# Patient Record
Sex: FEMALE | Race: OTHER | Employment: UNEMPLOYED | URBAN - METROPOLITAN AREA
[De-identification: names, ages, dates, MRNs, and addresses within clinical notes are randomized per-mention and may not be internally consistent; named-entity substitution may affect disease eponyms.]

---

## 2017-05-07 ENCOUNTER — APPOINTMENT (OUTPATIENT)
Dept: CT IMAGING | Age: 47
End: 2017-05-07
Attending: EMERGENCY MEDICINE

## 2017-05-07 ENCOUNTER — HOSPITAL ENCOUNTER (EMERGENCY)
Age: 47
Discharge: HOME | End: 2017-05-07
Attending: EMERGENCY MEDICINE

## 2017-05-07 VITALS
WEIGHT: 160.94 LBS | HEIGHT: 61 IN | SYSTOLIC BLOOD PRESSURE: 118 MMHG | RESPIRATION RATE: 16 BRPM | BODY MASS INDEX: 30.39 KG/M2 | HEART RATE: 82 BPM | OXYGEN SATURATION: 99 % | DIASTOLIC BLOOD PRESSURE: 74 MMHG | TEMPERATURE: 98 F

## 2017-05-07 DIAGNOSIS — R10.9 ABDOMINAL PAIN OF UNKNOWN ETIOLOGY: Primary | ICD-10-CM

## 2017-05-07 PROCEDURE — 80053 COMPREHEN METABOLIC PANEL: CPT

## 2017-05-07 PROCEDURE — 96375 TX/PRO/DX INJ NEW DRUG ADDON: CPT

## 2017-05-07 PROCEDURE — 99284 EMERGENCY DEPT VISIT MOD MDM: CPT

## 2017-05-07 PROCEDURE — 83690 ASSAY OF LIPASE: CPT

## 2017-05-07 PROCEDURE — 85025 COMPLETE CBC W/AUTO DIFF WBC: CPT

## 2017-05-07 PROCEDURE — 74176 CT ABD & PELVIS W/O CONTRAST: CPT | Performed by: EMERGENCY MEDICINE

## 2017-05-07 PROCEDURE — 86038 ANTINUCLEAR ANTIBODIES: CPT | Performed by: EMERGENCY MEDICINE

## 2017-05-07 PROCEDURE — 81003 URINALYSIS AUTO W/O SCOPE: CPT

## 2017-05-07 PROCEDURE — 96374 THER/PROPH/DIAG INJ IV PUSH: CPT

## 2017-05-07 PROCEDURE — 96376 TX/PRO/DX INJ SAME DRUG ADON: CPT

## 2017-05-07 RX ORDER — KETOROLAC TROMETHAMINE 30 MG/ML
30 INJECTION, SOLUTION INTRAMUSCULAR; INTRAVENOUS ONCE
Status: COMPLETED | OUTPATIENT
Start: 2017-05-07 | End: 2017-05-07

## 2017-05-07 RX ORDER — KETOROLAC TROMETHAMINE 10 MG/1
10 TABLET, FILM COATED ORAL EVERY 6 HOURS PRN
COMMUNITY

## 2017-05-07 RX ORDER — HYDROMORPHONE HYDROCHLORIDE 1 MG/ML
1 INJECTION, SOLUTION INTRAMUSCULAR; INTRAVENOUS; SUBCUTANEOUS EVERY 30 MIN PRN
Status: DISCONTINUED | OUTPATIENT
Start: 2017-05-07 | End: 2017-05-07

## 2017-05-07 RX ORDER — HYDROCODONE BITARTRATE AND ACETAMINOPHEN 5; 325 MG/1; MG/1
1 TABLET ORAL EVERY 6 HOURS PRN
COMMUNITY

## 2017-05-07 RX ORDER — ONDANSETRON 2 MG/ML
8 INJECTION INTRAMUSCULAR; INTRAVENOUS ONCE
Status: COMPLETED | OUTPATIENT
Start: 2017-05-07 | End: 2017-05-07

## 2017-05-07 RX ORDER — DICYCLOMINE HCL 20 MG
20 TABLET ORAL 4 TIMES DAILY PRN
Qty: 30 TABLET | Refills: 0 | Status: SHIPPED | OUTPATIENT
Start: 2017-05-07 | End: 2017-06-06

## 2017-05-07 NOTE — ED NOTES
Attempted to call patient to notify of radiologic discrepancy, there was no answer and then the phone went busy unable to leave message

## 2017-05-08 NOTE — ED NOTES
Unable to reach patient after multiple attempts regarding CT scan discrepancy. Certified letter and CT report sent to patient home to inform her of need for follow up with PCP regarding diverticulum vesicourachal seen on CT over read.

## 2017-05-08 NOTE — ED PROVIDER NOTES
Patient Seen in: THE [facility] Emergency Department In [place]    History   Patient presents with:  Abdomen/Flank Pain (GI/)    Stated Complaint: right sided abdomen and flank pain w nausea 3 days     HPI    Patient states that she has had 3 days of contin Pulse 05/07/17 0501 88   Resp 05/07/17 0501 16   Temp 05/07/17 0501 97.9 °F (36.6 °C)   Temp src --    SpO2 05/07/17 0501 96 %   O2 Device --        Current:/74 mmHg  Pulse 82  Temp(Src) 97.9 °F (36.6 °C)  Resp 16  Ht 154.9 cm (5' 1\")  Wt 73 kg  B lesion.     BILIARY:  Status post cholecystectomy. No dilated bile ducts are seen.   PANCREAS:  Normal.  No lesion, fluid collection, ductal dilatation, or atrophy.     SPLEEN:  Normal.  No enlargement or focal lesion.     KIDNEYS:  Normal.  No mass, obstru 4 (four) times daily as needed., Script printed, Disp-30 tablet, R-0

## (undated) NOTE — ED AVS SNAPSHOT
THE [facility] Emergency Department in [facility]    Phone:  [phone]    Fax:  [phone]           Malik Reyes   MRN: [id number]    Department:  THE [facility] Emergency Department in [place]   Date of Visit: Take 1 tablet (20 mg total) by mouth 4 (four) times daily as needed.             Where to Get Your Medications      You can get these medications from any pharmacy     Bring a paper prescription for each of these medications    - Dicyclomine HCl 20 MG Tabs tell this physician (or your personal doctor if your instructions are to return to your personal doctor) about any new or lasting problems. The primary care or specialist physician will see patients referred from the [facility] Emergency Department.  lCarence Kulkarni - If you are a smoker or have smoked in the last 12 months, we encourage you to explore options for quitting.     - If you have concerns related to behavioral health issues or thoughts of harming yourself, contact [facility] a

## (undated) NOTE — ED AVS SNAPSHOT
[facility] in [address] [place] [address]    Phone:  [phone]    Fax:  [phone]           Malik Reyes   MRN: [id number]    Department:  [facility] in [place]   Date of Visit: IF THERE IS ANY CHANGE OR WORSENING OF YOUR CONDITION, CALL YOUR PRIMARY CARE PHYSICIAN AT ONCE OR RETURN IMMEDIATELY TO THE EMERGENCY DEPARTMENT.     If you have been prescribed any medication(s), please fill your prescription right away and begin taking t